# Patient Record
Sex: FEMALE | Race: WHITE | NOT HISPANIC OR LATINO | URBAN - METROPOLITAN AREA
[De-identification: names, ages, dates, MRNs, and addresses within clinical notes are randomized per-mention and may not be internally consistent; named-entity substitution may affect disease eponyms.]

---

## 2018-03-01 ENCOUNTER — INPATIENT (INPATIENT)
Facility: HOSPITAL | Age: 73
LOS: 4 days | Discharge: HOME | DRG: 440 | End: 2018-03-06
Attending: INTERNAL MEDICINE | Admitting: INTERNAL MEDICINE
Payer: MEDICARE

## 2018-03-01 VITALS
HEIGHT: 66 IN | OXYGEN SATURATION: 99 % | DIASTOLIC BLOOD PRESSURE: 65 MMHG | WEIGHT: 166.89 LBS | HEART RATE: 78 BPM | SYSTOLIC BLOOD PRESSURE: 137 MMHG | TEMPERATURE: 98 F | RESPIRATION RATE: 20 BRPM

## 2018-03-01 DIAGNOSIS — Z98.890 OTHER SPECIFIED POSTPROCEDURAL STATES: Chronic | ICD-10-CM

## 2018-03-01 DIAGNOSIS — K85.90 ACUTE PANCREATITIS WITHOUT NECROSIS OR INFECTION, UNSPECIFIED: ICD-10-CM

## 2018-03-01 DIAGNOSIS — E11.9 TYPE 2 DIABETES MELLITUS WITHOUT COMPLICATIONS: ICD-10-CM

## 2018-03-01 DIAGNOSIS — Z29.9 ENCOUNTER FOR PROPHYLACTIC MEASURES, UNSPECIFIED: ICD-10-CM

## 2018-03-01 DIAGNOSIS — I10 ESSENTIAL (PRIMARY) HYPERTENSION: ICD-10-CM

## 2018-03-01 LAB
ALBUMIN SERPL ELPH-MCNC: 3.7 G/DL — SIGNIFICANT CHANGE UP (ref 3.5–5)
ALP SERPL-CCNC: 100 U/L — SIGNIFICANT CHANGE UP (ref 40–120)
ALT FLD-CCNC: 13 U/L DA — SIGNIFICANT CHANGE UP (ref 10–60)
ANION GAP SERPL CALC-SCNC: 6 MMOL/L — SIGNIFICANT CHANGE UP (ref 5–17)
AST SERPL-CCNC: 7 U/L — LOW (ref 10–40)
BILIRUB SERPL-MCNC: 0.4 MG/DL — SIGNIFICANT CHANGE UP (ref 0.2–1.2)
BUN SERPL-MCNC: 14 MG/DL — SIGNIFICANT CHANGE UP (ref 7–18)
CALCIUM SERPL-MCNC: 9.2 MG/DL — SIGNIFICANT CHANGE UP (ref 8.4–10.5)
CHLORIDE SERPL-SCNC: 101 MMOL/L — SIGNIFICANT CHANGE UP (ref 96–108)
CO2 SERPL-SCNC: 30 MMOL/L — SIGNIFICANT CHANGE UP (ref 22–31)
CREAT SERPL-MCNC: 0.82 MG/DL — SIGNIFICANT CHANGE UP (ref 0.5–1.3)
GLUCOSE BLDC GLUCOMTR-MCNC: 110 MG/DL — HIGH (ref 70–99)
GLUCOSE SERPL-MCNC: 182 MG/DL — HIGH (ref 70–99)
HCT VFR BLD CALC: 42 % — SIGNIFICANT CHANGE UP (ref 34.5–45)
HGB BLD-MCNC: 13.7 G/DL — SIGNIFICANT CHANGE UP (ref 11.5–15.5)
LACTATE SERPL-SCNC: 1 MMOL/L — SIGNIFICANT CHANGE UP (ref 0.7–2)
LIDOCAIN IGE QN: 381 U/L — SIGNIFICANT CHANGE UP (ref 73–393)
MCHC RBC-ENTMCNC: 27 PG — SIGNIFICANT CHANGE UP (ref 27–34)
MCHC RBC-ENTMCNC: 32.6 GM/DL — SIGNIFICANT CHANGE UP (ref 32–36)
MCV RBC AUTO: 83 FL — SIGNIFICANT CHANGE UP (ref 80–100)
PLATELET # BLD AUTO: 346 K/UL — SIGNIFICANT CHANGE UP (ref 150–400)
POTASSIUM SERPL-MCNC: 4.1 MMOL/L — SIGNIFICANT CHANGE UP (ref 3.5–5.3)
POTASSIUM SERPL-SCNC: 4.1 MMOL/L — SIGNIFICANT CHANGE UP (ref 3.5–5.3)
PROT SERPL-MCNC: 8.1 G/DL — SIGNIFICANT CHANGE UP (ref 6–8.3)
RBC # BLD: 5.06 M/UL — SIGNIFICANT CHANGE UP (ref 3.8–5.2)
RBC # FLD: 11.2 % — SIGNIFICANT CHANGE UP (ref 10.3–14.5)
SODIUM SERPL-SCNC: 137 MMOL/L — SIGNIFICANT CHANGE UP (ref 135–145)
WBC # BLD: 13.5 K/UL — HIGH (ref 3.8–10.5)
WBC # FLD AUTO: 13.5 K/UL — HIGH (ref 3.8–10.5)

## 2018-03-01 PROCEDURE — 99285 EMERGENCY DEPT VISIT HI MDM: CPT

## 2018-03-01 PROCEDURE — 71046 X-RAY EXAM CHEST 2 VIEWS: CPT | Mod: 26

## 2018-03-01 PROCEDURE — 74177 CT ABD & PELVIS W/CONTRAST: CPT | Mod: 26

## 2018-03-01 RX ORDER — SODIUM CHLORIDE 9 MG/ML
1000 INJECTION INTRAMUSCULAR; INTRAVENOUS; SUBCUTANEOUS ONCE
Qty: 0 | Refills: 0 | Status: COMPLETED | OUTPATIENT
Start: 2018-03-01 | End: 2018-03-01

## 2018-03-01 RX ORDER — FAMOTIDINE 10 MG/ML
20 INJECTION INTRAVENOUS ONCE
Qty: 0 | Refills: 0 | Status: COMPLETED | OUTPATIENT
Start: 2018-03-01 | End: 2018-03-01

## 2018-03-01 RX ORDER — LEVOTHYROXINE SODIUM 125 MCG
1 TABLET ORAL
Qty: 0 | Refills: 0 | COMMUNITY

## 2018-03-01 RX ORDER — ENOXAPARIN SODIUM 100 MG/ML
40 INJECTION SUBCUTANEOUS DAILY
Qty: 0 | Refills: 0 | Status: DISCONTINUED | OUTPATIENT
Start: 2018-03-01 | End: 2018-03-06

## 2018-03-01 RX ORDER — METFORMIN HYDROCHLORIDE 850 MG/1
1 TABLET ORAL
Qty: 0 | Refills: 0 | COMMUNITY

## 2018-03-01 RX ORDER — INSULIN LISPRO 100/ML
VIAL (ML) SUBCUTANEOUS
Qty: 0 | Refills: 0 | Status: DISCONTINUED | OUTPATIENT
Start: 2018-03-01 | End: 2018-03-06

## 2018-03-01 RX ORDER — SODIUM CHLORIDE 9 MG/ML
1000 INJECTION, SOLUTION INTRAVENOUS
Qty: 0 | Refills: 0 | Status: DISCONTINUED | OUTPATIENT
Start: 2018-03-01 | End: 2018-03-03

## 2018-03-01 RX ORDER — LEVOTHYROXINE SODIUM 125 MCG
100 TABLET ORAL DAILY
Qty: 0 | Refills: 0 | Status: DISCONTINUED | OUTPATIENT
Start: 2018-03-01 | End: 2018-03-06

## 2018-03-01 RX ORDER — KETOROLAC TROMETHAMINE 30 MG/ML
15 SYRINGE (ML) INJECTION EVERY 6 HOURS
Qty: 0 | Refills: 0 | Status: DISCONTINUED | OUTPATIENT
Start: 2018-03-01 | End: 2018-03-06

## 2018-03-01 RX ORDER — METOPROLOL TARTRATE 50 MG
1 TABLET ORAL
Qty: 0 | Refills: 0 | COMMUNITY

## 2018-03-01 RX ORDER — METOPROLOL TARTRATE 50 MG
50 TABLET ORAL
Qty: 0 | Refills: 0 | Status: DISCONTINUED | OUTPATIENT
Start: 2018-03-01 | End: 2018-03-06

## 2018-03-01 RX ADMIN — SODIUM CHLORIDE 500 MILLILITER(S): 9 INJECTION INTRAMUSCULAR; INTRAVENOUS; SUBCUTANEOUS at 13:58

## 2018-03-01 RX ADMIN — FAMOTIDINE 20 MILLIGRAM(S): 10 INJECTION INTRAVENOUS at 13:58

## 2018-03-01 RX ADMIN — Medication 15 MILLIGRAM(S): at 20:31

## 2018-03-01 RX ADMIN — Medication 15 MILLIGRAM(S): at 19:41

## 2018-03-01 NOTE — H&P ADULT - PROBLEM SELECTOR PLAN 1
73 yo F hx HTN, DM was sent from PMD due to abdominal pain   Abd CT:  focal edema of the pancreatic uncinate process with mild adjacent stranding Pancreatitis vs pancreatic CA  Pt meets 2/3 criteria for pancreatitis( pain and CT findings)   symptoms could also be 2/2 constipation   NPO for now  hydration   pain control    Pt might need MRCP  GI: Dr. Cowart

## 2018-03-01 NOTE — H&P ADULT - NSHPPHYSICALEXAM_GEN_ALL_CORE
Gen: NAD  HEENT: moist mucosa, PERRLA,   Neck: supple neck, no JVD  CVS: RRR, no RMG  Lungs: CTAx2, no wheezing, no rales, no rhonchi  Abd: soft, epigastric pain radiating to the back, epigastric tenderness, no distention, + BS  : no dysuria  Ext: no edema, no cyanosis   Skin: no blisters, no rashes  Neuro: AAOX3, no focal findings

## 2018-03-01 NOTE — ED PROVIDER NOTE - OBJECTIVE STATEMENT
73 y/o F pt w/ PMHx of HTN and DM and PSHx of Hysterectomy was sent to ED by PMD for epigastric abd pain and constipation for 3 days. Pt describes her pain as severe and radiating to the back. Pt reports that she is not passing gas. Pt denies fever, chills, dysuria, nausea, vomiting, or any other complaints.  Pt states that she was sent to the ED to get a CT of her abd. NKDA.

## 2018-03-01 NOTE — H&P ADULT - ASSESSMENT
71 y/o F pt w/ PMHx of HTN and DM  ED by PMD for epigastric abd pain and constipation for 3 days. Pt describes her pain as severe and radiating to the back.  Pt reports that she is not passing gas. Pt states that she was sent to the ED to get a CT of her abd for concerns of pancreatitis. At Ed abdominal CT was done and showed focal edema of the pancreatic uncinate process with mild adjacent stranding. Findings may represent focal acute pancreatitis or pancreatic cancer.  Pt denies fever, chills, dysuria, nausea, vomiting, or any other complaints.

## 2018-03-01 NOTE — ED ADULT NURSE REASSESSMENT NOTE - NS ED NURSE REASSESS COMMENT FT1
received pt awake alert oriented x 3 not in distress with saline lock intact no redness no swelling noted.
verbal report given to nurse hannah.
At present time patient refusing pain medication Dr. Johns at bedside doing admission. patient made aware if pain becomes worse and she requires pain medication to inform staff members. Patient verbalizes understanding continue to monitor patient.

## 2018-03-01 NOTE — ED ADULT NURSE NOTE - ED STAT RN HANDOFF DETAILS 2
Patient received from intake report from RN Bert patient admitted to medicine NPO status maintained , IV hydration in progress. Patient c/o epigastric pain on pain scale 6/10 crampy pain, no orders as of yet resident to be contacted. IV access to left AC patent and intact , A&OX4, OOB with assistance. Patient resting family at bedside instructed to call if needed. patient and family verbalize understanding continue to monitor patient.

## 2018-03-02 DIAGNOSIS — E03.9 HYPOTHYROIDISM, UNSPECIFIED: ICD-10-CM

## 2018-03-02 LAB
CANCER AG19-9 SERPL-ACNC: 39.4 U/ML — SIGNIFICANT CHANGE UP
CHOLEST SERPL-MCNC: 135 MG/DL — SIGNIFICANT CHANGE UP (ref 10–199)
GLUCOSE BLDC GLUCOMTR-MCNC: 104 MG/DL — HIGH (ref 70–99)
GLUCOSE BLDC GLUCOMTR-MCNC: 114 MG/DL — HIGH (ref 70–99)
GLUCOSE BLDC GLUCOMTR-MCNC: 87 MG/DL — SIGNIFICANT CHANGE UP (ref 70–99)
GLUCOSE BLDC GLUCOMTR-MCNC: 98 MG/DL — SIGNIFICANT CHANGE UP (ref 70–99)
HBA1C BLD-MCNC: 6.3 % — HIGH (ref 4–5.6)
HDLC SERPL-MCNC: 48 MG/DL — SIGNIFICANT CHANGE UP (ref 40–125)
LIPID PNL WITH DIRECT LDL SERPL: 67 MG/DL — SIGNIFICANT CHANGE UP
TOTAL CHOLESTEROL/HDL RATIO MEASUREMENT: 2.8 RATIO — LOW (ref 3.3–7.1)
TRIGL SERPL-MCNC: 102 MG/DL — SIGNIFICANT CHANGE UP (ref 10–149)

## 2018-03-02 PROCEDURE — 76700 US EXAM ABDOM COMPLETE: CPT | Mod: 26

## 2018-03-02 RX ADMIN — Medication 50 MILLIGRAM(S): at 06:12

## 2018-03-02 RX ADMIN — Medication 100 MICROGRAM(S): at 06:12

## 2018-03-02 RX ADMIN — Medication 50 MILLIGRAM(S): at 17:10

## 2018-03-02 RX ADMIN — ENOXAPARIN SODIUM 40 MILLIGRAM(S): 100 INJECTION SUBCUTANEOUS at 13:35

## 2018-03-02 NOTE — PROGRESS NOTE ADULT - PROBLEM SELECTOR PLAN 1
CT abd showed focal edema of pancreas. Lipase 381. Elevated wbc count of 13.5. Normal AST/ALT. Pt is NPO  -GI to meet with patient today Dr. Cowart -f/u consult  -C/w toradol 15 mg IV q6 for pain   -F/u CA 19-9 CT abd showed focal edema of pancreas. Lipase 381. Elevated wbc count of 13.5. Normal AST/ALT.   -GI to meet with patient today Dr. Cowart -f/u consult  -Clear liquid diet  -C/w toradol 15 mg IV q6 for pain   -F/u CA 19-9 CT abd showed focal edema of pancreas. Lipase 381. Elevated wbc count of 13.5. Normal AST/ALT. Ca19-9 normal-39.4  -GI to meet with patient today Dr. Cowart -f/u consult  -Clear liquid diet  -C/w toradol 15 mg IV q6 for pain CT abd showed focal edema of pancreas. Lipase 381. Elevated wbc count of 13.5. Normal AST/ALT. Ca19-9 normal-39.4  -GI to meet with patient today Dr. Cowart -f/u consult  - will keep pt NPO until GI see pt  f/u US abd , r/o gallstone  no hx etoh use, lipid profile normal  -C/w toradol 15 mg IV q6 for pain

## 2018-03-02 NOTE — PROGRESS NOTE ADULT - SUBJECTIVE AND OBJECTIVE BOX
Patient seen and examined at bedside. Was brought up from the ED last night. Says her pain has been controlled since receiving Toradol. Denies feeling feverish, chills, nausea, or vomiting.    ICU Vital Signs Last 24 Hrs  T(C): 36.8 (02 Mar 2018 05:19), Max: 37.4 (01 Mar 2018 22:20)  T(F): 98.2 (02 Mar 2018 05:19), Max: 99.3 (01 Mar 2018 22:20)  HR: 84 (02 Mar 2018 05:19) (78 - 84)  BP: 155/71 (02 Mar 2018 05:19) (137/65 - 155/71)  RR: 17 (02 Mar 2018 05:19) (16 - 20)  SpO2: 95% (02 Mar 2018 05:19) (95% - 99%)    MEDICATIONS  (STANDING):  enoxaparin Injectable 40 milliGRAM(s) SubCutaneous daily  insulin lispro (HumaLOG) corrective regimen sliding scale   SubCutaneous three times a day before meals  lactated ringers. 1000 milliLiter(s) (150 mL/Hr) IV Continuous <Continuous>  levothyroxine 100 MICROGram(s) Oral daily  metoprolol     tartrate 50 milliGRAM(s) Oral two times a day    MEDICATIONS  (PRN):  ketorolac   Injectable 15 milliGRAM(s) IV Push every 6 hours PRN Moderate Pain (4 - 6)    .  LABS:                         13.7   13.5  )-----------( 346      ( 01 Mar 2018 13:44 )             42.0     03-01    137  |  101  |  14  ----------------------------<  182<H>  4.1   |  30  |  0.82    Ca    9.2      01 Mar 2018 13:46    TPro  8.1  /  Alb  3.7  /  TBili  0.4  /  DBili  x   /  AST  7<L>  /  ALT  13  /  AlkPhos  100  03-01      CT Abd: Focal edema of pancreatic uncinate process with mild adjacent stranding.    PE:  General: Well appearing female lying in bed. No acute distress  HENT: Normocephalic, atraumatic. Neck supple, no JVD.  Lungs: Clear to auscultation bilaterally.   Cardiac: Regular rate, rhythm. No murmurs  Abd: Soft, mild tenderness to palpation upon upper and lower left quadrants, +epigastric tenderness. No rebound, no guarding. +BS  Extremities: No lower extremity edema bilaterally

## 2018-03-03 LAB
ANION GAP SERPL CALC-SCNC: 10 MMOL/L — SIGNIFICANT CHANGE UP (ref 5–17)
BASOPHILS # BLD AUTO: 0 K/UL — SIGNIFICANT CHANGE UP (ref 0–0.2)
BASOPHILS NFR BLD AUTO: 0.4 % — SIGNIFICANT CHANGE UP (ref 0–2)
BUN SERPL-MCNC: 10 MG/DL — SIGNIFICANT CHANGE UP (ref 7–18)
CALCIUM SERPL-MCNC: 8.7 MG/DL — SIGNIFICANT CHANGE UP (ref 8.4–10.5)
CHLORIDE SERPL-SCNC: 106 MMOL/L — SIGNIFICANT CHANGE UP (ref 96–108)
CO2 SERPL-SCNC: 27 MMOL/L — SIGNIFICANT CHANGE UP (ref 22–31)
CREAT SERPL-MCNC: 0.56 MG/DL — SIGNIFICANT CHANGE UP (ref 0.5–1.3)
EOSINOPHIL # BLD AUTO: 0.3 K/UL — SIGNIFICANT CHANGE UP (ref 0–0.5)
EOSINOPHIL NFR BLD AUTO: 3.2 % — SIGNIFICANT CHANGE UP (ref 0–6)
GLUCOSE BLDC GLUCOMTR-MCNC: 100 MG/DL — HIGH (ref 70–99)
GLUCOSE BLDC GLUCOMTR-MCNC: 104 MG/DL — HIGH (ref 70–99)
GLUCOSE BLDC GLUCOMTR-MCNC: 91 MG/DL — SIGNIFICANT CHANGE UP (ref 70–99)
GLUCOSE BLDC GLUCOMTR-MCNC: 97 MG/DL — SIGNIFICANT CHANGE UP (ref 70–99)
GLUCOSE SERPL-MCNC: 80 MG/DL — SIGNIFICANT CHANGE UP (ref 70–99)
HCT VFR BLD CALC: 33.8 % — LOW (ref 34.5–45)
HGB BLD-MCNC: 11.7 G/DL — SIGNIFICANT CHANGE UP (ref 11.5–15.5)
LIDOCAIN IGE QN: 181 U/L — SIGNIFICANT CHANGE UP (ref 73–393)
LYMPHOCYTES # BLD AUTO: 2 K/UL — SIGNIFICANT CHANGE UP (ref 1–3.3)
LYMPHOCYTES # BLD AUTO: 23.7 % — SIGNIFICANT CHANGE UP (ref 13–44)
MCHC RBC-ENTMCNC: 29.1 PG — SIGNIFICANT CHANGE UP (ref 27–34)
MCHC RBC-ENTMCNC: 34.6 GM/DL — SIGNIFICANT CHANGE UP (ref 32–36)
MCV RBC AUTO: 84 FL — SIGNIFICANT CHANGE UP (ref 80–100)
MONOCYTES # BLD AUTO: 0.5 K/UL — SIGNIFICANT CHANGE UP (ref 0–0.9)
MONOCYTES NFR BLD AUTO: 5.9 % — SIGNIFICANT CHANGE UP (ref 2–14)
NEUTROPHILS # BLD AUTO: 5.5 K/UL — SIGNIFICANT CHANGE UP (ref 1.8–7.4)
NEUTROPHILS NFR BLD AUTO: 66.8 % — SIGNIFICANT CHANGE UP (ref 43–77)
PLATELET # BLD AUTO: 243 K/UL — SIGNIFICANT CHANGE UP (ref 150–400)
POTASSIUM SERPL-MCNC: 3.8 MMOL/L — SIGNIFICANT CHANGE UP (ref 3.5–5.3)
POTASSIUM SERPL-SCNC: 3.8 MMOL/L — SIGNIFICANT CHANGE UP (ref 3.5–5.3)
RBC # BLD: 4.02 M/UL — SIGNIFICANT CHANGE UP (ref 3.8–5.2)
RBC # FLD: 11.1 % — SIGNIFICANT CHANGE UP (ref 10.3–14.5)
SODIUM SERPL-SCNC: 143 MMOL/L — SIGNIFICANT CHANGE UP (ref 135–145)
WBC # BLD: 8.3 K/UL — SIGNIFICANT CHANGE UP (ref 3.8–10.5)
WBC # FLD AUTO: 8.3 K/UL — SIGNIFICANT CHANGE UP (ref 3.8–10.5)

## 2018-03-03 RX ADMIN — Medication 100 MICROGRAM(S): at 06:51

## 2018-03-03 RX ADMIN — Medication 50 MILLIGRAM(S): at 17:19

## 2018-03-03 RX ADMIN — Medication 50 MILLIGRAM(S): at 06:51

## 2018-03-03 RX ADMIN — ENOXAPARIN SODIUM 40 MILLIGRAM(S): 100 INJECTION SUBCUTANEOUS at 12:01

## 2018-03-03 NOTE — CONSULT NOTE ADULT - SUBJECTIVE AND OBJECTIVE BOX
[  ] STAT REQUEST              [ X]R OUTINE REQUEST    Patient is a 72 year old female admitted with abdominal pain. GI consulted to evaluate.      HPI:  72 year old female with multiple medical problems presented with epigastric pain radiating to her back associated with nausea but no vomiting. Patient denies hematemesis, hematochezia, melena, fever, chills, chest pain, SOB, cough, palpitation, hematuria, dysuria or diarrhea.          PAIN MANAGEMENT:  Pain Scale:                7-8 /10  Pain Location:  Epigastric abdominal pain      Prior Colonoscopy: Unknown      PAST MEDICAL HISTORY  DM   HTN       PAST SURGICAL HISTORY  Abdominal hysterectomy      Allergies    No Known Allergies         MEDICATIONS  (STANDING):  enoxaparin Injectable 40 milliGRAM(s) SubCutaneous daily  insulin lispro (HumaLOG) corrective regimen sliding scale   SubCutaneous three times a day before meals  levothyroxine 100 MICROGram(s) Oral daily  metoprolol     tartrate 50 milliGRAM(s) Oral two times a day    MEDICATIONS  (PRN):  ketorolac   Injectable 15 milliGRAM(s) IV Push every 6 hours PRN Moderate Pain (4 - 6)      SOCIAL HISTORY  Advanced Directives:       [ X ] Full Code       [  ] DNR  Marital Status:         [ X ] M      [  ] S      [  ] D       [  ] W  Children:       [ X ] Yes      [  ] No  Occupation:        [  ] Employed       [ X ] Unemployed       [  ] Retired  Diet:       [ X ] Regular       [  ] PEG feeding          [  ] NG tube feeding  Drug Use:           [ X ] Patient denied          [  ] Yes  Alcohol:           [ X ] No             [  ] Yes (socially)         [  ] Yes (chronic)  Tobacco:           [  ] Yes           [ X ] No      FAMILY HISTORY  [ X ] Heart Disease(father)            [  ] Diabetes             [  ] HTN             [  ] Colon Cancer             [  ] Stomach Cancer              [  ] Pancreatic Cancer      VITAL SIGNS   Vital Signs Last 24 Hrs  T(C): 36.5 (02 Mar 2018 20:51), Max: 36.8 (02 Mar 2018 14:26)  T(F): 97.7 (02 Mar 2018 20:51), Max: 98.2 (02 Mar 2018 14:26)  HR: 69 (02 Mar 2018 20:51) (69 - 72)  BP: 141/63 (02 Mar 2018 20:51) (141/63 - 160/81)   RR: 17 (02 Mar 2018 20:51) (17 - 17)  SpO2: 96% (02 Mar 2018 20:51) (95% - 96%)          CBC Full  -  ( 03 Mar 2018 06:13 )  WBC Count : 8.3 K/uL  Hemoglobin : 11.7 g/dL  Hematocrit : 33.8 %  Platelet Count - Automated : 243 K/uL  Mean Cell Volume : 84.0 fl  Mean Cell Hemoglobin : 29.1 pg  Mean Cell Hemoglobin Concentration : 34.6 gm/dL  Auto Neutrophil # : 5.5 K/uL  Auto Lymphocyte # : 2.0 K/uL  Auto Monocyte # : 0.5 K/uL  Auto Eosinophil # : 0.3 K/uL  Auto Basophil # : 0.0 K/uL  Auto Neutrophil % : 66.8 %  Auto Lymphocyte % : 23.7 %  Auto Monocyte % : 5.9 %  Auto Eosinophil % : 3.2 %  Auto Basophil % : 0.4 %      03-03    143  |  106  |  10  ----------------------------<  80  3.8   |  27  |  0.56    Ca    8.7      03 Mar 2018 06:13    TPro  8.1  /  Alb  3.7  /  TBili  0.4  /  DBili  x   /  AST  7<L>  /  ALT  13  /  AlkPhos  100  03-01      Lipase, Serum: 181 U/L (03-03 @ 10:58)  Lipase, Serum: 381 U/L (03.01.18 @ 13:44)      RADIOLOGY/IMAGING                EXAM:  CT ABDOMEN AND PELVIS OC IC                            PROCEDURE DATE:  03/01/2018          INTERPRETATION:  CT of the abdomen and pelvis with IV contrast    Clinical Indication: abdominal pain    Technique: Axial multidetector CT images of the abdomen and pelvis are   acquired following the administration of oral and IV contrast (95 cc   Omnipaque-350 administered, 5 cc discarded).    Comparison: None.    Findings:    Abdomen: Limited sections through the lung bases demonstrate small   bilateral atelectasis. Mild hepatic steatosis. Small focal mural   thickening at the gallbladder fundus. No CT evidence for a gallstone. The   common bile duct is not dilated. There is focal edema of the pancreatic   uncinate processes with mild adjacent stranding. Findings may represent   focal acute pancreatitis, or pancreatic cancer. No evidence for   pancreatic necrosis at this time.     Mild splenomegaly at 13.3 cm. The adrenals and the right kidney appear   unremarkable. Tiny hypodense lesion in the left kidney, too small to   characterize. No evidence for a ureteral calculus. No hydronephrosis.    The appendix appears normal. Colonic diverticulosis without evidence for   diverticulitis. No evidence for bowel obstruction, or grossly thickened   bowel wall. No evidence for free air, ascites, or enlarged lymph node.    Pelvis: The urinary bladder is within normal limits. The uterus is not   visualized, probably surgically absent. Sigmoid diverticulosis without   evidence for diverticulitis. No pelvic free fluid, or enlarged lymph node.    Impression: Mild hepatic steatosis.    Small focal mural thickening at the gallbladder fundus may represent   adenomyomatosis. If clinically indicated, abdominal MR without and with   IV contrast may be pursued for further evaluation on a nonemergent   outpatient basis.    Focal edema of the pancreatic uncinate process with mild adjacent   stranding. Findings may represent focal acute pancreatitis or pancreatic   cancer. Clinical correlation with pancreatic enzymes is recommended. No   evidence for pancreatic necrosis at this time.     Mild splenomegaly.      EXAM:  US ABDOMEN COMPLETE                            PROCEDURE DATE:  03/02/2018          INTERPRETATION:  EXAM: US ABDOMEN COMPLETE   INDICATION: Pancreatitis. r/o gallstones.  COMPARISON: CT abdomen and pelvis 3/1/2018.    TECHNIQUE: Grayscale ultrasound of the abdomen was performed.    FINDINGS:  Liver: Increased echogenicity and echotexture. No focal hepatic mass is   demonstrated. Measures 19.5 cm in craniocaudal span. Portal and hepatic   veins are patent.    Bile ducts: No intra or extrahepatic biliary ductal dilatation. Common   duct = 5 mm.    Gallbladder: The focal mural thickening of the gallbladder fundus seen on   CT is not well seen on ultrasound. No stones, pericholecystic fluid or   gallbladder wall thickening. No sonographic Damon's sign was elicited by   the sonographer.    Pancreas: Visualized pancreatic head and body are grossly unremarkable.   The pancreatic tail is obscured by bowel gas.     Right kidney: Survey views. No hydronephrosis. Measures 11.6 x 4.4 x 4.2  cm.  Left kidney: Survey views. No hydronephrosis. Measures 10.4 x 5.1 x 5.0   cm.    Spleen: Mild splenomegaly, partially visualized.    Peritoneum: No ascites.    Proximal Aorta & IVC: Visualized abdominal aorta and IVC are grossly   unremarkable.    IMPRESSION:  No sonographic evidence of cholelithiasis or acute cholecystitis. No   biliary ductal dilatation.    Visualized pancreatic head and body are grossly unremarkable in   sonographic appearance.    The focal mural thickening of the gallbladder fundus seen on CT is not   well seen on ultrasound; however, the recommendation remains; if   clinically indicated, MRI abdomen without and with contrast may be   performed for further evaluation on a nonemergent outpatient basis.    Diffuse hepatic steatosis and hepatomegaly.    Mild splenomegaly.

## 2018-03-03 NOTE — CONSULT NOTE ADULT - ASSESSMENT
Abdominal pain  1. R/o pancreatitis  2. R/o pancreatic cancer    Suggestions:    1. MRI of abdomen  2. Check   3. NPO   4. IV flouid hydration  5. Reopeat amylase and lipase

## 2018-03-03 NOTE — PROGRESS NOTE ADULT - PROBLEM SELECTOR PLAN 1
CT abd showed focal edema of pancreas. Lipase 381. Elevated wbc count of 13.5. Normal AST/ALT. Ca19-9 normal-39.4  -GI to meet with patient today Dr. Cowart -f/u consult  - will keep pt NPO until GI see pt  f/u US abd , r/o gallstone  no hx etoh use, lipid profile normal  -C/w toradol 15 mg IV q6 for pain

## 2018-03-03 NOTE — PROGRESS NOTE ADULT - SUBJECTIVE AND OBJECTIVE BOX
CHIEF COMPLAINT:Patient is a 72y old  Female who presents with a chief complaint of abdominal pain. Pt appears comfortable, no abdominal pain.    	  REVIEW OF SYSTEMS:  CONSTITUTIONAL: No fever, weight loss, or fatigue  EYES: No eye pain, visual disturbances, or discharge  ENT:  No difficulty hearing, tinnitus, vertigo; No sinus or throat pain  NECK: No pain or stiffness  RESPIRATORY: No cough, wheezing, chills or hemoptysis; No Shortness of Breath  CARDIOVASCULAR: No chest pain, palpitations, passing out, dizziness, or leg swelling  GASTROINTESTINAL: No abdominal or epigastric pain. No nausea, vomiting, or hematemesis; No diarrhea or constipation. No melena or hematochezia.  GENITOURINARY: No dysuria, frequency, hematuria, or incontinence  NEUROLOGICAL: No headaches, memory loss, loss of strength, numbness, or tremors  SKIN: No itching, burning, rashes, or lesions   LYMPH Nodes: No enlarged glands  ENDOCRINE: No heat or cold intolerance; No hair loss  MUSCULOSKELETAL: No joint pain or swelling; No muscle, back, or extremity pain  PSYCHIATRIC: No depression, anxiety, mood swings, or difficulty sleeping  HEME/LYMPH: No easy bruising, or bleeding gums  ALLERGY AND IMMUNOLOGIC: No hives or eczema	      PHYSICAL EXAM:  T(C): 36.5 (03-02-18 @ 20:51), Max: 36.8 (03-02-18 @ 14:26)  HR: 69 (03-02-18 @ 20:51) (69 - 72)  BP: 141/63 (03-02-18 @ 20:51) (141/63 - 160/81)  RR: 17 (03-02-18 @ 20:51) (17 - 17)  SpO2: 96% (03-02-18 @ 20:51) (95% - 96%)    Appearance: Normal	  HEENT:   Normal oral mucosa, PERRL, EOMI	  Lymphatic: No lymphadenopathy  Cardiovascular: Normal S1 S2, No JVD, No murmurs, No edema  Respiratory: Lungs clear to auscultation	  Psychiatry: A & O x 3, Mood & affect appropriate  Gastrointestinal:  Soft, Non-tender, + BS	  Skin: No rashes, No ecchymoses, No cyanosis	  Neurologic: Non-focal  Extremities: Normal range of motion, No clubbing, cyanosis or edema  Vascular: Peripheral pulses palpable 2+ bilaterally    MEDICATIONS  (STANDING):  enoxaparin Injectable 40 milliGRAM(s) SubCutaneous daily  insulin lispro (HumaLOG) corrective regimen sliding scale   SubCutaneous three times a day before meals  lactated ringers. 1000 milliLiter(s) (150 mL/Hr) IV Continuous <Continuous>  levothyroxine 100 MICROGram(s) Oral daily  metoprolol     tartrate 50 milliGRAM(s) Oral two times a day      LABS:	 	                      11.7   8.3   )-----------( 243      ( 03 Mar 2018 06:13 )             33.8     03-03    143  |  106  |  10  ----------------------------<  80  3.8   |  27  |  0.56    Ca    8.7      03 Mar 2018 06:13    TPro  8.1  /  Alb  3.7  /  TBili  0.4  /  DBili  x   /  AST  7<L>  /  ALT  13  /  AlkPhos  100  03-01       Lipid Profile: Cholesterol 135  LDL 67  HDL 48    Cholesterol 153  LDL 74  HDL 57      HgA1c: Hemoglobin A1C, Whole Blood: 6.3 % (03-02 @ 09:32)    Lipase, Serum (03.03.18 @ 10:58)    Lipase, Serum: 181 U/L        IMPRESSION:  No sonographic evidence of cholelithiasis or acute cholecystitis. No   biliary ductal dilatation.    Visualized pancreatic head and body are grossly unremarkable in   sonographic appearance.    The focal mural thickening of the gallbladder fundus seen on CT is not   well seen on ultrasound; however, the recommendation remains; if   clinically indicated, MRI abdomen without and with contrast may be   performed for further evaluation on a nonemergent outpatient basis.    Diffuse hepatic steatosis and hepatomegaly.    Mild splenomegaly.

## 2018-03-03 NOTE — CONSULT NOTE ADULT - NEGATIVE ENMT SYMPTOMS
no dry mouth/no nose bleeds/no throat pain/no dysphagia/no hearing difficulty/no ear pain/no gum bleeding

## 2018-03-04 LAB
ALBUMIN SERPL ELPH-MCNC: 3.3 G/DL — LOW (ref 3.5–5)
ALP SERPL-CCNC: 78 U/L — SIGNIFICANT CHANGE UP (ref 40–120)
ALT FLD-CCNC: 15 U/L DA — SIGNIFICANT CHANGE UP (ref 10–60)
ANION GAP SERPL CALC-SCNC: 6 MMOL/L — SIGNIFICANT CHANGE UP (ref 5–17)
AST SERPL-CCNC: 13 U/L — SIGNIFICANT CHANGE UP (ref 10–40)
BILIRUB SERPL-MCNC: 0.3 MG/DL — SIGNIFICANT CHANGE UP (ref 0.2–1.2)
BUN SERPL-MCNC: 8 MG/DL — SIGNIFICANT CHANGE UP (ref 7–18)
CALCIUM SERPL-MCNC: 8.9 MG/DL — SIGNIFICANT CHANGE UP (ref 8.4–10.5)
CHLORIDE SERPL-SCNC: 103 MMOL/L — SIGNIFICANT CHANGE UP (ref 96–108)
CO2 SERPL-SCNC: 31 MMOL/L — SIGNIFICANT CHANGE UP (ref 22–31)
CREAT SERPL-MCNC: 0.71 MG/DL — SIGNIFICANT CHANGE UP (ref 0.5–1.3)
GLUCOSE BLDC GLUCOMTR-MCNC: 119 MG/DL — HIGH (ref 70–99)
GLUCOSE BLDC GLUCOMTR-MCNC: 123 MG/DL — HIGH (ref 70–99)
GLUCOSE BLDC GLUCOMTR-MCNC: 129 MG/DL — HIGH (ref 70–99)
GLUCOSE BLDC GLUCOMTR-MCNC: 193 MG/DL — HIGH (ref 70–99)
GLUCOSE SERPL-MCNC: 133 MG/DL — HIGH (ref 70–99)
HCT VFR BLD CALC: 38 % — SIGNIFICANT CHANGE UP (ref 34.5–45)
HGB BLD-MCNC: 12.6 G/DL — SIGNIFICANT CHANGE UP (ref 11.5–15.5)
LIDOCAIN IGE QN: 162 U/L — SIGNIFICANT CHANGE UP (ref 73–393)
MCHC RBC-ENTMCNC: 27.3 PG — SIGNIFICANT CHANGE UP (ref 27–34)
MCHC RBC-ENTMCNC: 33.2 GM/DL — SIGNIFICANT CHANGE UP (ref 32–36)
MCV RBC AUTO: 82 FL — SIGNIFICANT CHANGE UP (ref 80–100)
PLATELET # BLD AUTO: 328 K/UL — SIGNIFICANT CHANGE UP (ref 150–400)
POTASSIUM SERPL-MCNC: 4 MMOL/L — SIGNIFICANT CHANGE UP (ref 3.5–5.3)
POTASSIUM SERPL-SCNC: 4 MMOL/L — SIGNIFICANT CHANGE UP (ref 3.5–5.3)
PROT SERPL-MCNC: 7.1 G/DL — SIGNIFICANT CHANGE UP (ref 6–8.3)
RBC # BLD: 4.62 M/UL — SIGNIFICANT CHANGE UP (ref 3.8–5.2)
RBC # FLD: 11 % — SIGNIFICANT CHANGE UP (ref 10.3–14.5)
SODIUM SERPL-SCNC: 140 MMOL/L — SIGNIFICANT CHANGE UP (ref 135–145)
WBC # BLD: 9.4 K/UL — SIGNIFICANT CHANGE UP (ref 3.8–10.5)
WBC # FLD AUTO: 9.4 K/UL — SIGNIFICANT CHANGE UP (ref 3.8–10.5)

## 2018-03-04 RX ADMIN — Medication 50 MILLIGRAM(S): at 17:48

## 2018-03-04 RX ADMIN — Medication 50 MILLIGRAM(S): at 06:05

## 2018-03-04 RX ADMIN — Medication 1: at 16:51

## 2018-03-04 RX ADMIN — Medication 100 MICROGRAM(S): at 06:05

## 2018-03-04 NOTE — DIETITIAN INITIAL EVALUATION ADULT. - OTHER INFO
Patient seen for NPO/clear liquid x4days. Patient from home lives alone, visited pt. alert but weak, reports appetite fair with 3 days of abdominal pain & constipation, denies nausea/vomiting or diarrhea PTA, stated  Lbs & unsure wt. loss/gain, presently clear liquid diet & tolerating, followed by GI/MD & consult noted, willing to have oral supplement with meals, skin intact. Discussed with MD/RN.

## 2018-03-04 NOTE — PROGRESS NOTE ADULT - SUBJECTIVE AND OBJECTIVE BOX
CHIEF COMPLAINT:Patient is a 72y old  Female who presents with a chief complaint of abdominal pain. Pt has no further abdominal pain.    	  REVIEW OF SYSTEMS:  CONSTITUTIONAL: No fever, weight loss, or fatigue  EYES: No eye pain, visual disturbances, or discharge  ENT:  No difficulty hearing, tinnitus, vertigo; No sinus or throat pain  NECK: No pain or stiffness  RESPIRATORY: No cough, wheezing, chills or hemoptysis; No Shortness of Breath  CARDIOVASCULAR: No chest pain, palpitations, passing out, dizziness, or leg swelling  GASTROINTESTINAL: No abdominal or epigastric pain. No nausea, vomiting, or hematemesis; No diarrhea or constipation. No melena or hematochezia.  GENITOURINARY: No dysuria, frequency, hematuria, or incontinence  NEUROLOGICAL: No headaches, memory loss, loss of strength, numbness, or tremors  SKIN: No itching, burning, rashes, or lesions   LYMPH Nodes: No enlarged glands  ENDOCRINE: No heat or cold intolerance; No hair loss  MUSCULOSKELETAL: No joint pain or swelling; No muscle, back, or extremity pain  PSYCHIATRIC: No depression, anxiety, mood swings, or difficulty sleeping  HEME/LYMPH: No easy bruising, or bleeding gums  ALLERGY AND IMMUNOLOGIC: No hives or eczema	    PHYSICAL EXAM:  T(C): 36.4 (03-04-18 @ 05:40), Max: 37.3 (03-03-18 @ 14:16)  HR: 64 (03-04-18 @ 09:58) (61 - 74)  BP: 144/84 (03-04-18 @ 09:58) (138/75 - 155/100)  RR: 18 (03-04-18 @ 05:40) (18 - 18)  SpO2: 97% (03-04-18 @ 05:40) (95% - 97%)  Wt(kg): --  I&O's Summary    03 Mar 2018 07:01  -  04 Mar 2018 07:00  --------------------------------------------------------  IN: 0 mL / OUT: 1 mL / NET: -1 mL        Appearance: Normal	  HEENT:   Normal oral mucosa, PERRL, EOMI	  Lymphatic: No lymphadenopathy  Cardiovascular: Normal S1 S2, No JVD, No murmurs, No edema  Respiratory: Lungs clear to auscultation	  Psychiatry: A & O x 3, Mood & affect appropriate  Gastrointestinal:  Soft, Non-tender, + BS	  Skin: No rashes, No ecchymoses, No cyanosis	  Neurologic: Non-focal  Extremities: Normal range of motion, No clubbing, cyanosis or edema  Vascular: Peripheral pulses palpable 2+ bilaterally    MEDICATIONS  (STANDING):  enoxaparin Injectable 40 milliGRAM(s) SubCutaneous daily  insulin lispro (HumaLOG) corrective regimen sliding scale   SubCutaneous three times a day before meals  levothyroxine 100 MICROGram(s) Oral daily  metoprolol     tartrate 50 milliGRAM(s) Oral two times a day      	  LABS:	 	                       12.6   9.4   )-----------( 328      ( 04 Mar 2018 08:01 )             38.0     03-04    140  |  103  |  8   ----------------------------<  133<H>  4.0   |  31  |  0.71    Ca    8.9      04 Mar 2018 08:01    TPro  7.1  /  Alb  3.3<L>  /  TBili  0.3  /  DBili  x   /  AST  13  /  ALT  15  /  AlkPhos  78  03-04      Lipid Profile: Cholesterol 135  LDL 67  HDL 48    Cholesterol 153  LDL 74  HDL 57      HgA1c: Hemoglobin A1C, Whole Blood: 6.3 % (03-02 @ 09:32)

## 2018-03-04 NOTE — DIETITIAN INITIAL EVALUATION ADULT. - NS AS NUTRI INTERV MEDICAL AND FOOD SUPPLEMENTS
Commercial beverage/once diet advance Add Glucerna Shake 1can bid as medically feasible (440kcal, 20g protein)

## 2018-03-04 NOTE — DIETITIAN INITIAL EVALUATION ADULT. - MD RECOMMEND
Pending GI decision advance diet with nutrition supplement, presently add Ensure Clear 1 can TID as medically feasible

## 2018-03-05 LAB
ANION GAP SERPL CALC-SCNC: 9 MMOL/L — SIGNIFICANT CHANGE UP (ref 5–17)
BASOPHILS # BLD AUTO: 0 K/UL — SIGNIFICANT CHANGE UP (ref 0–0.2)
BASOPHILS NFR BLD AUTO: 0.5 % — SIGNIFICANT CHANGE UP (ref 0–2)
BUN SERPL-MCNC: 8 MG/DL — SIGNIFICANT CHANGE UP (ref 7–18)
CALCIUM SERPL-MCNC: 9 MG/DL — SIGNIFICANT CHANGE UP (ref 8.4–10.5)
CANCER AG19-9 SERPL-ACNC: 38.8 U/ML — SIGNIFICANT CHANGE UP
CHLORIDE SERPL-SCNC: 102 MMOL/L — SIGNIFICANT CHANGE UP (ref 96–108)
CO2 SERPL-SCNC: 29 MMOL/L — SIGNIFICANT CHANGE UP (ref 22–31)
CREAT SERPL-MCNC: 0.8 MG/DL — SIGNIFICANT CHANGE UP (ref 0.5–1.3)
EOSINOPHIL # BLD AUTO: 0.4 K/UL — SIGNIFICANT CHANGE UP (ref 0–0.5)
EOSINOPHIL NFR BLD AUTO: 4 % — SIGNIFICANT CHANGE UP (ref 0–6)
GLUCOSE BLDC GLUCOMTR-MCNC: 132 MG/DL — HIGH (ref 70–99)
GLUCOSE BLDC GLUCOMTR-MCNC: 132 MG/DL — HIGH (ref 70–99)
GLUCOSE BLDC GLUCOMTR-MCNC: 143 MG/DL — HIGH (ref 70–99)
GLUCOSE BLDC GLUCOMTR-MCNC: 190 MG/DL — HIGH (ref 70–99)
GLUCOSE SERPL-MCNC: 144 MG/DL — HIGH (ref 70–99)
HCT VFR BLD CALC: 38.6 % — SIGNIFICANT CHANGE UP (ref 34.5–45)
HGB BLD-MCNC: 13.1 G/DL — SIGNIFICANT CHANGE UP (ref 11.5–15.5)
LYMPHOCYTES # BLD AUTO: 2.2 K/UL — SIGNIFICANT CHANGE UP (ref 1–3.3)
LYMPHOCYTES # BLD AUTO: 23.8 % — SIGNIFICANT CHANGE UP (ref 13–44)
MCHC RBC-ENTMCNC: 28.4 PG — SIGNIFICANT CHANGE UP (ref 27–34)
MCHC RBC-ENTMCNC: 34 GM/DL — SIGNIFICANT CHANGE UP (ref 32–36)
MCV RBC AUTO: 83.6 FL — SIGNIFICANT CHANGE UP (ref 80–100)
MONOCYTES # BLD AUTO: 0.4 K/UL — SIGNIFICANT CHANGE UP (ref 0–0.9)
MONOCYTES NFR BLD AUTO: 4.6 % — SIGNIFICANT CHANGE UP (ref 2–14)
NEUTROPHILS # BLD AUTO: 6.1 K/UL — SIGNIFICANT CHANGE UP (ref 1.8–7.4)
NEUTROPHILS NFR BLD AUTO: 67 % — SIGNIFICANT CHANGE UP (ref 43–77)
PLATELET # BLD AUTO: 343 K/UL — SIGNIFICANT CHANGE UP (ref 150–400)
POTASSIUM SERPL-MCNC: 3.2 MMOL/L — LOW (ref 3.5–5.3)
POTASSIUM SERPL-SCNC: 3.2 MMOL/L — LOW (ref 3.5–5.3)
RBC # BLD: 4.62 M/UL — SIGNIFICANT CHANGE UP (ref 3.8–5.2)
RBC # FLD: 11.2 % — SIGNIFICANT CHANGE UP (ref 10.3–14.5)
SODIUM SERPL-SCNC: 140 MMOL/L — SIGNIFICANT CHANGE UP (ref 135–145)
WBC # BLD: 9.1 K/UL — SIGNIFICANT CHANGE UP (ref 3.8–10.5)
WBC # FLD AUTO: 9.1 K/UL — SIGNIFICANT CHANGE UP (ref 3.8–10.5)

## 2018-03-05 PROCEDURE — 74181 MRI ABDOMEN W/O CONTRAST: CPT | Mod: 26

## 2018-03-05 RX ORDER — POTASSIUM CHLORIDE 20 MEQ
20 PACKET (EA) ORAL EVERY 4 HOURS
Qty: 0 | Refills: 0 | Status: COMPLETED | OUTPATIENT
Start: 2018-03-05 | End: 2018-03-05

## 2018-03-05 RX ORDER — POTASSIUM CHLORIDE 20 MEQ
10 PACKET (EA) ORAL
Qty: 0 | Refills: 0 | Status: DISCONTINUED | OUTPATIENT
Start: 2018-03-05 | End: 2018-03-05

## 2018-03-05 RX ADMIN — Medication 100 MILLIEQUIVALENT(S): at 09:54

## 2018-03-05 RX ADMIN — Medication 50 MILLIGRAM(S): at 05:48

## 2018-03-05 RX ADMIN — Medication 50 MILLIGRAM(S): at 18:24

## 2018-03-05 RX ADMIN — Medication 20 MILLIEQUIVALENT(S): at 13:50

## 2018-03-05 RX ADMIN — Medication 20 MILLIEQUIVALENT(S): at 18:24

## 2018-03-05 RX ADMIN — Medication 100 MICROGRAM(S): at 05:48

## 2018-03-05 NOTE — PROGRESS NOTE ADULT - SUBJECTIVE AND OBJECTIVE BOX
Patient seen and examined at bedside. No events overnight. Says that she currently does not have abdominal pain. Had bowel movement this AM. Is awaiting to undergo MRCP today.    ICU Vital Signs Last 24 Hrs  T(C): 36.9 (05 Mar 2018 05:22), Max: 37.1 (04 Mar 2018 14:32)  T(F): 98.4 (05 Mar 2018 05:22), Max: 98.8 (04 Mar 2018 14:32)  HR: 73 (05 Mar 2018 05:22) (64 - 79)  BP: 150/66 (05 Mar 2018 05:22) (132/70 - 161/81)  RR: 18 (05 Mar 2018 05:22) (18 - 18)  SpO2: 99% (05 Mar 2018 05:22) (95% - 99%)    .  LABS:                         13.1   9.1   )-----------( 343      ( 05 Mar 2018 06:54 )             38.6     03-05    140  |  102  |  8   ----------------------------<  144<H>  3.2<L>   |  29  |  0.80    Ca    9.0      05 Mar 2018 06:54    TPro  7.1  /  Alb  3.3<L>  /  TBili  0.3  /  DBili  x   /  AST  13  /  ALT  15  /  AlkPhos  78  03-04    MEDICATIONS  (STANDING):  enoxaparin Injectable 40 milliGRAM(s) SubCutaneous daily  insulin lispro (HumaLOG) corrective regimen sliding scale   SubCutaneous three times a day before meals  levothyroxine 100 MICROGram(s) Oral daily  LORazepam   Injectable 1 milliGRAM(s) IV Push once  metoprolol     tartrate 50 milliGRAM(s) Oral two times a day  potassium chloride    Tablet ER 20 milliEquivalent(s) Oral every 4 hours    MEDICATIONS  (PRN):  ketorolac   Injectable 15 milliGRAM(s) IV Push every 6 hours PRN Moderate Pain (4 - 6) Patient seen and examined at bedside. No events overnight. Says that she currently does not have abdominal pain. Had bowel movement this AM. Is awaiting to undergo MRCP today.    ICU Vital Signs Last 24 Hrs  T(C): 36.9 (05 Mar 2018 05:22), Max: 37.1 (04 Mar 2018 14:32)  T(F): 98.4 (05 Mar 2018 05:22), Max: 98.8 (04 Mar 2018 14:32)  HR: 73 (05 Mar 2018 05:22) (64 - 79)  BP: 150/66 (05 Mar 2018 05:22) (132/70 - 161/81)  RR: 18 (05 Mar 2018 05:22) (18 - 18)  SpO2: 99% (05 Mar 2018 05:22) (95% - 99%)    .  LABS:                         13.1   9.1   )-----------( 343      ( 05 Mar 2018 06:54 )             38.6     03-05    140  |  102  |  8   ----------------------------<  144<H>  3.2<L>   |  29  |  0.80    Ca    9.0      05 Mar 2018 06:54    TPro  7.1  /  Alb  3.3<L>  /  TBili  0.3  /  DBili  x   /  AST  13  /  ALT  15  /  AlkPhos  78  03-04    MEDICATIONS  (STANDING):  enoxaparin Injectable 40 milliGRAM(s) SubCutaneous daily  insulin lispro (HumaLOG) corrective regimen sliding scale   SubCutaneous three times a day before meals  levothyroxine 100 MICROGram(s) Oral daily  LORazepam   Injectable 1 milliGRAM(s) IV Push once  metoprolol     tartrate 50 milliGRAM(s) Oral two times a day  potassium chloride    Tablet ER 20 milliEquivalent(s) Oral every 4 hours    MEDICATIONS  (PRN):  ketorolac   Injectable 15 milliGRAM(s) IV Push every 6 hours PRN Moderate Pain (4 - 6)    PE:   General: Lying in bed comfortably. No acute distress  HENT: Normocephalic, atraumatic.   Cardiac: Regular rate, regular rhythm. No murmurs  Lungs: Clear to auscultation bilaterally  Abd: Soft, non-tender, non-distended. +BS. No guarding  Extremities: No lower extremity edema bilaterally

## 2018-03-05 NOTE — PROGRESS NOTE ADULT - SUBJECTIVE AND OBJECTIVE BOX
CHIEF COMPLAINT:Patient is a 72y old  Female who presents with a chief complaint of abdominal pain. Pt     	  REVIEW OF SYSTEMS:  CONSTITUTIONAL: No fever, weight loss, or fatigue  EYES: No eye pain, visual disturbances, or discharge  ENT:  No difficulty hearing, tinnitus, vertigo; No sinus or throat pain  NECK: No pain or stiffness  RESPIRATORY: No cough, wheezing, chills or hemoptysis; No Shortness of Breath  CARDIOVASCULAR: No chest pain, palpitations, passing out, dizziness, or leg swelling  GASTROINTESTINAL: No abdominal or epigastric pain. No nausea, vomiting, or hematemesis; No diarrhea or constipation. No melena or hematochezia.  GENITOURINARY: No dysuria, frequency, hematuria, or incontinence  NEUROLOGICAL: No headaches, memory loss, loss of strength, numbness, or tremors  SKIN: No itching, burning, rashes, or lesions   LYMPH Nodes: No enlarged glands  ENDOCRINE: No heat or cold intolerance; No hair loss  MUSCULOSKELETAL: No joint pain or swelling; No muscle, back, or extremity pain  PSYCHIATRIC: No depression, anxiety, mood swings, or difficulty sleeping  HEME/LYMPH: No easy bruising, or bleeding gums  ALLERGY AND IMMUNOLOGIC: No hives or eczema	        PHYSICAL EXAM:  T(C): 36.9 (03-05-18 @ 05:22), Max: 37.1 (03-04-18 @ 14:32)  HR: 73 (03-05-18 @ 05:22) (64 - 79)  BP: 150/66 (03-05-18 @ 05:22) (132/70 - 161/81)  RR: 18 (03-05-18 @ 05:22) (18 - 18)  SpO2: 99% (03-05-18 @ 05:22) (95% - 99%)      Appearance: Normal	  HEENT:   Normal oral mucosa, PERRL, EOMI	  Lymphatic: No lymphadenopathy  Cardiovascular: Normal S1 S2, No JVD, No murmurs, No edema  Respiratory: Lungs clear to auscultation	  Psychiatry: A & O x 3, Mood & affect appropriate  Gastrointestinal:  Soft, Non-tender, + BS	  Skin: No rashes, No ecchymoses, No cyanosis	  Neurologic: Non-focal  Extremities: Normal range of motion, No clubbing, cyanosis or edema  Vascular: Peripheral pulses palpable 2+ bilaterally    MEDICATIONS  (STANDING):  enoxaparin Injectable 40 milliGRAM(s) SubCutaneous daily  insulin lispro (HumaLOG) corrective regimen sliding scale   SubCutaneous three times a day before meals  levothyroxine 100 MICROGram(s) Oral daily  metoprolol     tartrate 50 milliGRAM(s) Oral two times a day  potassium chloride  10 mEq/100 mL IVPB 10 milliEquivalent(s) IV Intermittent every 1 hour      	  LABS:	 	                        13.1   9.1   )-----------( 343      ( 05 Mar 2018 06:54 )             38.6     03-05    140  |  102  |  8   ----------------------------<  144<H>  3.2<L>   |  29  |  0.80    Ca    9.0      05 Mar 2018 06:54    TPro  7.1  /  Alb  3.3<L>  /  TBili  0.3  /  DBili  x   /  AST  13  /  ALT  15  /  AlkPhos  78  03-04      Lipid Profile: Cholesterol 135  LDL 67  HDL 48    Cholesterol 153  LDL 74  HDL 57      HgA1c: Hemoglobin A1C, Whole Blood: 6.3 % (03-02 @ 09:32)    Cancer Antigen, GI Ca 19-9 (03.04.18 @ 13:24)    Cancer Antigen, GI Ca 19-9: 38.8: METHOD: Tom[employer]ases Chemiluminescent Immunoassay  Values obtained with different assay methods or kits cannot be used  interchangeably.  Results cannot be interpreted as absolute evidence of the presence or  absence of malignant disease. U/mL

## 2018-03-05 NOTE — PROGRESS NOTE ADULT - SUBJECTIVE AND OBJECTIVE BOX
[   ] ICU                                          [   ] CCU                                      [ X  ] Medical Floor    Patient is comfortable. No new complaints reported, No abdominal pain, N/V, hematemesis, hematochezia, melena, fever, chills, chest pain, SOB, cough or diarrhea reported.    VITALS  Vital Signs Last 24 Hrs  T(C): 36.7 (05 Mar 2018 14:51), Max: 36.9 (05 Mar 2018 05:22)  T(F): 98.1 (05 Mar 2018 14:51), Max: 98.4 (05 Mar 2018 05:22)  HR: 75 (05 Mar 2018 14:51) (64 - 75)  BP: 118/55 (05 Mar 2018 14:51) (118/55 - 161/81)  BP(mean): --  RR: 18 (05 Mar 2018 14:51) (18 - 18)  SpO2: 98% (05 Mar 2018 14:51) (95% - 99%)         MEDICATIONS  (STANDING):  enoxaparin Injectable 40 milliGRAM(s) SubCutaneous daily  insulin lispro (HumaLOG) corrective regimen sliding scale   SubCutaneous three times a day before meals  levothyroxine 100 MICROGram(s) Oral daily  LORazepam   Injectable 1 milliGRAM(s) IV Push once  metoprolol     tartrate 50 milliGRAM(s) Oral two times a day  potassium chloride    Tablet ER 20 milliEquivalent(s) Oral every 4 hours    MEDICATIONS  (PRN):  ketorolac   Injectable 15 milliGRAM(s) IV Push every 6 hours PRN Moderate Pain (4 - 6)                            13.1   9.1   )-----------( 343      ( 05 Mar 2018 06:54 )             38.6       03-05    140  |  102  |  8   ----------------------------<  144<H>  3.2<L>   |  29  |  0.80    Ca    9.0      05 Mar 2018 06:54    TPro  7.1  /  Alb  3.3<L>  /  TBili  0.3  /  DBili  x   /  AST  13  /  ALT  15  /  AlkPhos  78  03-04      EXAM:  MR ABDOMEN                            PROCEDURE DATE:  03/05/2018          INTERPRETATION:  MRCP without IV contrast    Indication: Acute pancreatitis.     Technique: Utilizing a 1.5 Keily high-field magnet, multiplanar   multisequence MR images of the abdomen are acquired without IV contrast,   supplemented by thin and thick slab MRCP images. IV contrast is not given   due to patient's inability to tolerate the examination.    Comparison: No prior abdominal MR is available for comparison.    Findings: No evidence for gallstone, thickened bladder wall or   pericholecystic fluid. There is a small 10 mm cystic lesion in the   gallbladder fundal wall, compatible with adenomyomatosis. No intra or   extrahepatic biliary ductal dilatation.The common bile duct measures up   to 4 mm in caliber which is within normal limits. No evidence for   choledocholithiasis. The main pancreatic duct maintains normal caliber   without dilatation. There is normal configuration of the main pancreatic   duct without evidence for pancreas divisum.    There is mild T2 hyperintensity in the pancreatic uncinate process with   adjacent edema. The findings may represent focal acute pancreatitis or   pancreatic mass/cancer. Clinical correlation with pancreatic enzymes is   recommended.    There is a 5 mm brightly T2 hyperintense lesion in the liver dome,   suggestive of a cyst or hemangioma. Further differentiation is difficult   without IV contrast.    Allowing for the noncontrast technique, the spleen, adrenals and right   kidney appear grossly unremarkable. Tiny brightly T2 hyperintense lesions   are seen in the left kidney, representing probable cysts.     No evidence for enlarged lymph node.     Trace perihepatic and perisplenic ascites.    Impression: No evidence for cholelithiasis or choledocholithiasis.    Mild T2 hyperintensity in the pancreatic uncinate process with adjacent   edema. The findings may represent focal acute pancreatitis or pancreatic   mass/cancer. Clinical correlation with pancreatic enzymes is recommended.    Trace ascites.    Other findings as above.

## 2018-03-05 NOTE — PROGRESS NOTE ADULT - PROBLEM SELECTOR PLAN 1
Pancreatitis vs pancreatic CA. Repeat lipase wnl-181. U/S showed no gallstones. Dr. Cowart onboard and to undergo MRCP today.  -F/u MRCP results  -NPO except medications  -C/w toradol 15 mg q6 PRN  -Can switch to PO potassium

## 2018-03-06 VITALS
HEART RATE: 64 BPM | RESPIRATION RATE: 18 BRPM | DIASTOLIC BLOOD PRESSURE: 89 MMHG | TEMPERATURE: 98 F | OXYGEN SATURATION: 97 % | SYSTOLIC BLOOD PRESSURE: 147 MMHG

## 2018-03-06 LAB
ANION GAP SERPL CALC-SCNC: 7 MMOL/L — SIGNIFICANT CHANGE UP (ref 5–17)
BASOPHILS # BLD AUTO: 0.1 K/UL — SIGNIFICANT CHANGE UP (ref 0–0.2)
BASOPHILS NFR BLD AUTO: 0.6 % — SIGNIFICANT CHANGE UP (ref 0–2)
BUN SERPL-MCNC: 17 MG/DL — SIGNIFICANT CHANGE UP (ref 7–18)
CALCIUM SERPL-MCNC: 8.6 MG/DL — SIGNIFICANT CHANGE UP (ref 8.4–10.5)
CHLORIDE SERPL-SCNC: 102 MMOL/L — SIGNIFICANT CHANGE UP (ref 96–108)
CO2 SERPL-SCNC: 30 MMOL/L — SIGNIFICANT CHANGE UP (ref 22–31)
CREAT SERPL-MCNC: 0.76 MG/DL — SIGNIFICANT CHANGE UP (ref 0.5–1.3)
EOSINOPHIL # BLD AUTO: 0.4 K/UL — SIGNIFICANT CHANGE UP (ref 0–0.5)
EOSINOPHIL NFR BLD AUTO: 4.4 % — SIGNIFICANT CHANGE UP (ref 0–6)
GLUCOSE BLDC GLUCOMTR-MCNC: 122 MG/DL — HIGH (ref 70–99)
GLUCOSE BLDC GLUCOMTR-MCNC: 155 MG/DL — HIGH (ref 70–99)
GLUCOSE BLDC GLUCOMTR-MCNC: 178 MG/DL — HIGH (ref 70–99)
GLUCOSE SERPL-MCNC: 149 MG/DL — HIGH (ref 70–99)
HCT VFR BLD CALC: 38.9 % — SIGNIFICANT CHANGE UP (ref 34.5–45)
HGB BLD-MCNC: 12.8 G/DL — SIGNIFICANT CHANGE UP (ref 11.5–15.5)
LYMPHOCYTES # BLD AUTO: 2.1 K/UL — SIGNIFICANT CHANGE UP (ref 1–3.3)
LYMPHOCYTES # BLD AUTO: 22.3 % — SIGNIFICANT CHANGE UP (ref 13–44)
MAGNESIUM SERPL-MCNC: 2.1 MG/DL — SIGNIFICANT CHANGE UP (ref 1.6–2.6)
MCHC RBC-ENTMCNC: 27.2 PG — SIGNIFICANT CHANGE UP (ref 27–34)
MCHC RBC-ENTMCNC: 32.8 GM/DL — SIGNIFICANT CHANGE UP (ref 32–36)
MCV RBC AUTO: 83.1 FL — SIGNIFICANT CHANGE UP (ref 80–100)
MONOCYTES # BLD AUTO: 0.6 K/UL — SIGNIFICANT CHANGE UP (ref 0–0.9)
MONOCYTES NFR BLD AUTO: 6.3 % — SIGNIFICANT CHANGE UP (ref 2–14)
NEUTROPHILS # BLD AUTO: 6.2 K/UL — SIGNIFICANT CHANGE UP (ref 1.8–7.4)
NEUTROPHILS NFR BLD AUTO: 66.3 % — SIGNIFICANT CHANGE UP (ref 43–77)
PHOSPHATE SERPL-MCNC: 3.1 MG/DL — SIGNIFICANT CHANGE UP (ref 2.5–4.5)
PLATELET # BLD AUTO: 353 K/UL — SIGNIFICANT CHANGE UP (ref 150–400)
POTASSIUM SERPL-MCNC: 4.4 MMOL/L — SIGNIFICANT CHANGE UP (ref 3.5–5.3)
POTASSIUM SERPL-SCNC: 4.4 MMOL/L — SIGNIFICANT CHANGE UP (ref 3.5–5.3)
RBC # BLD: 4.68 M/UL — SIGNIFICANT CHANGE UP (ref 3.8–5.2)
RBC # FLD: 11.3 % — SIGNIFICANT CHANGE UP (ref 10.3–14.5)
SODIUM SERPL-SCNC: 139 MMOL/L — SIGNIFICANT CHANGE UP (ref 135–145)
WBC # BLD: 9.3 K/UL — SIGNIFICANT CHANGE UP (ref 3.8–10.5)
WBC # FLD AUTO: 9.3 K/UL — SIGNIFICANT CHANGE UP (ref 3.8–10.5)

## 2018-03-06 PROCEDURE — 83735 ASSAY OF MAGNESIUM: CPT

## 2018-03-06 PROCEDURE — 83690 ASSAY OF LIPASE: CPT

## 2018-03-06 PROCEDURE — 83036 HEMOGLOBIN GLYCOSYLATED A1C: CPT

## 2018-03-06 PROCEDURE — 80048 BASIC METABOLIC PNL TOTAL CA: CPT

## 2018-03-06 PROCEDURE — 74177 CT ABD & PELVIS W/CONTRAST: CPT

## 2018-03-06 PROCEDURE — 85027 COMPLETE CBC AUTOMATED: CPT

## 2018-03-06 PROCEDURE — 83605 ASSAY OF LACTIC ACID: CPT

## 2018-03-06 PROCEDURE — 71046 X-RAY EXAM CHEST 2 VIEWS: CPT

## 2018-03-06 PROCEDURE — 99285 EMERGENCY DEPT VISIT HI MDM: CPT | Mod: 25

## 2018-03-06 PROCEDURE — 80053 COMPREHEN METABOLIC PANEL: CPT

## 2018-03-06 PROCEDURE — 74181 MRI ABDOMEN W/O CONTRAST: CPT

## 2018-03-06 PROCEDURE — 93005 ELECTROCARDIOGRAM TRACING: CPT

## 2018-03-06 PROCEDURE — 82962 GLUCOSE BLOOD TEST: CPT

## 2018-03-06 PROCEDURE — 86301 IMMUNOASSAY TUMOR CA 19-9: CPT

## 2018-03-06 PROCEDURE — 80061 LIPID PANEL: CPT

## 2018-03-06 PROCEDURE — 84100 ASSAY OF PHOSPHORUS: CPT

## 2018-03-06 PROCEDURE — 76700 US EXAM ABDOM COMPLETE: CPT

## 2018-03-06 RX ADMIN — Medication 100 MICROGRAM(S): at 06:12

## 2018-03-06 RX ADMIN — Medication 50 MILLIGRAM(S): at 06:12

## 2018-03-06 RX ADMIN — Medication 50 MILLIGRAM(S): at 17:18

## 2018-03-06 RX ADMIN — ENOXAPARIN SODIUM 40 MILLIGRAM(S): 100 INJECTION SUBCUTANEOUS at 12:36

## 2018-03-06 NOTE — PROGRESS NOTE ADULT - GASTROINTESTINAL DETAILS
no guarding/no distention/no rebound tenderness/no rigidity/bowel sounds normal/soft
soft/no distention/no rebound tenderness/nontender/bowel sounds normal/no guarding

## 2018-03-06 NOTE — DISCHARGE NOTE ADULT - PATIENT PORTAL LINK FT
You can access the [facility] Patient Portal, offered by [facility], by registering with the following website: [email or web address]

## 2018-03-06 NOTE — PROGRESS NOTE ADULT - NEGATIVE GENERAL GENITOURINARY SYMPTOMS
no hematuria/no renal colic/no incontinence/no dysuria
no incontinence/no hematuria/no dysuria/no renal colic

## 2018-03-06 NOTE — DISCHARGE NOTE ADULT - CARE PLAN
Principal Discharge DX:	Pancreatitis  Goal:	to resolve  Assessment and plan of treatment:	improved, no more medications , f/u gastrologist and PCP  Secondary Diagnosis:	DM (diabetes mellitus)  Assessment and plan of treatment:	Take all medications as prescribed.  Seek medical assistance if you experience similar signs or symptoms as this hospitalization.  Follow up laboratory values and clinical status with your primary medical doctor within 10 days of hospital discharge  Secondary Diagnosis:	HTN (hypertension)  Assessment and plan of treatment:	Take all medications as prescribed.  Seek medical assistance if you experience similar signs or symptoms as this hospitalization.  Follow up laboratory values and clinical status with your primary medical doctor within 10 days of hospital discharge

## 2018-03-06 NOTE — PROGRESS NOTE ADULT - SUBJECTIVE AND OBJECTIVE BOX
CHIEF COMPLAINT:Patient is a 72y old  Female who presents with a chief complaint of abdominal pain .Pt has no complaints.    	  REVIEW OF SYSTEMS:  CONSTITUTIONAL: No fever, weight loss, or fatigue  EYES: No eye pain, visual disturbances, or discharge  ENT:  No difficulty hearing, tinnitus, vertigo; No sinus or throat pain  NECK: No pain or stiffness  RESPIRATORY: No cough, wheezing, chills or hemoptysis; No Shortness of Breath  CARDIOVASCULAR: No chest pain, palpitations, passing out, dizziness, or leg swelling  GASTROINTESTINAL: No abdominal or epigastric pain. No nausea, vomiting, or hematemesis; No diarrhea or constipation. No melena or hematochezia.  GENITOURINARY: No dysuria, frequency, hematuria, or incontinence  NEUROLOGICAL: No headaches, memory loss, loss of strength, numbness, or tremors  SKIN: No itching, burning, rashes, or lesions   LYMPH Nodes: No enlarged glands  ENDOCRINE: No heat or cold intolerance; No hair loss  MUSCULOSKELETAL: No joint pain or swelling; No muscle, back, or extremity pain  PSYCHIATRIC: No depression, anxiety, mood swings, or difficulty sleeping  HEME/LYMPH: No easy bruising, or bleeding gums  ALLERGY AND IMMUNOLOGIC: No hives or eczema	      PHYSICAL EXAM:  T(C): 36.7 (03-06-18 @ 05:24), Max: 36.8 (03-05-18 @ 22:14)  HR: 67 (03-06-18 @ 05:24) (67 - 75)  BP: 133/81 (03-06-18 @ 05:24) (118/55 - 150/83)  RR: 17 (03-06-18 @ 05:24) (16 - 18)  SpO2: 97% (03-06-18 @ 05:24) (97% - 98%)      Appearance: Normal	  HEENT:   Normal oral mucosa, PERRL, EOMI	  Lymphatic: No lymphadenopathy  Cardiovascular: Normal S1 S2, No JVD, No murmurs, No edema  Respiratory: Lungs clear to auscultation	  Psychiatry: A & O x 3, Mood & affect appropriate  Gastrointestinal:  Soft, Non-tender, + BS	  Skin: No rashes, No ecchymoses, No cyanosis	  Neurologic: Non-focal  Extremities: Normal range of motion, No clubbing, cyanosis or edema  Vascular: Peripheral pulses palpable 2+ bilaterally    MEDICATIONS  (STANDING):  enoxaparin Injectable 40 milliGRAM(s) SubCutaneous daily  insulin lispro (HumaLOG) corrective regimen sliding scale   SubCutaneous three times a day before meals  levothyroxine 100 MICROGram(s) Oral daily  LORazepam   Injectable 1 milliGRAM(s) IV Push once  metoprolol     tartrate 50 milliGRAM(s) Oral two times a day      LABS:	 	                       12.8   9.3   )-----------( 353      ( 06 Mar 2018 07:51 )             38.9     03-06    139  |  102  |  17  ----------------------------<  149<H>  4.4   |  30  |  0.76    Ca    8.6      06 Mar 2018 07:51  Phos  3.1     03-06  Mg     2.1     03-06      Lipid Profile: Cholesterol 135  LDL 67  HDL 48    Cholesterol 153  LDL 74  HDL 57      HgA1c: Hemoglobin A1C, Whole Blood: 6.3 % (03-02 @ 09:32)        Findings: No evidence for gallstone, thickened bladder wall or   pericholecystic fluid. There is a small 10 mm cystic lesion in the   gallbladder fundal wall, compatible with adenomyomatosis. No intra or   extrahepatic biliary ductal dilatation.The common bile duct measures up   to 4 mm in caliber which is within normal limits. No evidence for   choledocholithiasis. The main pancreatic duct maintains normal caliber   without dilatation. There is normal configuration of the main pancreatic   duct without evidence for pancreas divisum.    There is mild T2 hyperintensity in the pancreatic uncinate process with   adjacent edema. The findings may represent focal acute pancreatitis or   pancreatic mass/cancer. Clinical correlation with pancreatic enzymes is   recommended.    There is a 5 mm brightly T2 hyperintense lesion in the liver dome,   suggestive of a cyst or hemangioma. Further differentiation is difficult   without IV contrast.    Allowing for the noncontrast technique, the spleen, adrenals and right   kidney appear grossly unremarkable. Tiny brightly T2 hyperintense lesions   are seen in the left kidney, representing probable cysts.     No evidence for enlarged lymph node.     Trace perihepatic and perisplenic ascites.    Impression: No evidence for cholelithiasis or choledocholithiasis.    Mild T2 hyperintensity in the pancreatic uncinate process with adjacent   edema. The findings may represent focal acute pancreatitis or pancreatic   mass/cancer. Clinical correlation with pancreatic enzymes is recommended.    Trace ascites.    Other findings as above.

## 2018-03-06 NOTE — DISCHARGE NOTE ADULT - HOSPITAL COURSE
Ms Melendrez is a 72 year old female with PMHx of HTN, DM, and hypothyroidism who presented to the ED because of abdominal pain and constipation for three days. She says that the pain started after eating a bag of peanuts and described the pain as severe and radiating to her back. Pt denied any alcohol, drug use, or tobacco use. Upon arrival to ER a CT scan abdomen was performed which showed focal edema of the pancreatic uncinate process with mild adjacent stranding. Pt was afebrile with normal vitals and labs were drawn with lipase of 181 and wbc of 13.5. Pt was admitted to [address] [place] [address] for workup of possible pancreatitis vs pancreatic mass. On 3/2 pt was made NPO and Ca 19-9 was obtained-normal results of 39.4. Abdominal ultrasound was obtained on 3/2 to rule out gallstones. U/s showed no sonographic evidence of cholelithiasis or acute cholecystitis. No biliary ductal dilatation. GI was consulted and Dr. Cowart met with patient on 3/3 who recommended a MR of abdomen for further evaluation. On 3/4 patient said she did not have anymore pain and repeat lipase was wnl- 162. On 3/5 pt went for MRCP but was unable to tolerate IV contrast so MR abdomen w/o IV contrast was done. Results from this test showed no evidence for cholelithiasis or choledocholithiasis. Mild T2 hyperintensity in the pancreatic uncinate process with adjacent edema. The findings may represent focal acute pancreatitis or pancreatic mass/cancer. Per GI, patient's diet was advanced on 3/6 and her abdominal pain has resolved. Pt can be discharged with further evaluation of possible pancreatic mass/ca as outpatient.

## 2018-03-06 NOTE — PROGRESS NOTE ADULT - PROVIDER SPECIALTY LIST ADULT
Cardiology
Gastroenterology
Internal Medicine
Internal Medicine
Cardiology
Internal Medicine
Gastroenterology

## 2018-03-06 NOTE — DISCHARGE NOTE ADULT - PLAN OF CARE
to resolve improved, no more medications , f/u gastrologist and PCP Take all medications as prescribed.  Seek medical assistance if you experience similar signs or symptoms as this hospitalization.  Follow up laboratory values and clinical status with your primary medical doctor within 10 days of hospital discharge

## 2018-03-06 NOTE — PROGRESS NOTE ADULT - NEUROLOGICAL DETAILS
alert and oriented x 3/responds to verbal commands/sensation intact/responds to pain
responds to pain/responds to verbal commands

## 2018-03-06 NOTE — PROGRESS NOTE ADULT - SUBJECTIVE AND OBJECTIVE BOX
[   ] ICU                                          [   ] CCU                                      [ X  ] Medical Floor    Patient is comfortable. No new complaints reported, No abdominal pain, N/V, hematemesis, hematochezia, melena, fever, chills, chest pain, SOB, cough or diarrhea reported.    VITALS  Vital Signs Last 24 Hrs  T(C): 37.1 (06 Mar 2018 14:01), Max: 37.1 (06 Mar 2018 14:01)  T(F): 98.7 (06 Mar 2018 14:01), Max: 98.7 (06 Mar 2018 14:01)  HR: 73 (06 Mar 2018 17:19) (63 - 74)  BP: 163/87 (06 Mar 2018 17:19) (133/81 - 163/87)  BP(mean): --  RR: 18 (06 Mar 2018 14:01) (16 - 18)  SpO2: 97% (06 Mar 2018 14:01) (97% - 98%)       MEDICATIONS  (STANDING):  enoxaparin Injectable 40 milliGRAM(s) SubCutaneous daily  insulin lispro (HumaLOG) corrective regimen sliding scale   SubCutaneous three times a day before meals  levothyroxine 100 MICROGram(s) Oral daily  LORazepam   Injectable 1 milliGRAM(s) IV Push once  metoprolol     tartrate 50 milliGRAM(s) Oral two times a day    MEDICATIONS  (PRN):                            12.8   9.3   )-----------( 353      ( 06 Mar 2018 07:51 )             38.9       03-06    139  |  102  |  17  ----------------------------<  149<H>  4.4   |  30  |  0.76    Ca    8.6      06 Mar 2018 07:51  Phos  3.1     03-06  Mg     2.1     03-06

## 2018-03-06 NOTE — PROGRESS NOTE ADULT - PROBLEM SELECTOR PLAN 3
-C/w insulin sliding scale
-Insulin sliding scale
BP controlled  -C/w metoprolol 50 mg BID
-Insulin sliding scale

## 2018-03-06 NOTE — PROGRESS NOTE ADULT - NEGATIVE ENMT SYMPTOMS
no gum bleeding/no dysphagia/no nose bleeds/no dry mouth/no hearing difficulty/no throat pain/no ear pain
no dysphagia/no nose bleeds/no throat pain/no hearing difficulty/no gum bleeding/no ear pain

## 2018-03-06 NOTE — PROGRESS NOTE ADULT - PROBLEM SELECTOR PLAN 1
Pain has resolved. MR results from yesterday were inconclusive. Wnl lipase and Ca19-9.   -Advance diet  -Avoid NSAIDS   -Follow up as outpatient regarding MR results

## 2018-03-06 NOTE — PROGRESS NOTE ADULT - PROBLEM SELECTOR PROBLEM 3
DM (diabetes mellitus)
HTN (hypertension)
DM (diabetes mellitus)

## 2018-03-06 NOTE — PROGRESS NOTE ADULT - ASSESSMENT
72 year old female with PMHx of HTN and DM who presented to the ED because of abdominal pain,pancreatitis.  1.GI eval appreciated-await MRCP  2.Advance diet.  3.DM-Insulin.  4.HTN-cont BP medication.  5.GI and DVT prophylaxis.
72 year old female with PMHx of HTN and DM who presented to the ED because of abdominal pain,pancreatitis.  1.GI eval-p.  2.Clear liquid diet.  3.D/C IVF.  4.DM-Insulin.  5.HTN-cont BP medication.  6.GI and DVT prophylaxis.
72 year old female with PMHx of HTN and DM who presented to the ED because of abdominal pain,pancreatitis.  1.GI f/u, if cleared can go home and follow up as outpatient..  2.DM-Insulin.  3.HTN-cont BP medication.  4.GI and DVT prophylaxis.
Ms. Urias is a 72 year old female with PMHx of HTN and DM who presented to the ED because of abdominal pain for the past three days. Said that the pain was located epigastrically and radiated to her back on the left side. Patient also said she has been constipated for the past three days but denied any nausea, vomiting. In ED, vitals were stable and wbc count was 13.5, and lipase was 381. CT abd done which showed focal edema of pancreatic uncinate process with mild adjacent stranding. Patient admitted to [facility] for pancreatitis vs pancreatic ca.
Ms. Urias is a 72 year old female with PMHx of HTN and DM who presented to the ED because of abdominal pain for the past three days. Said that the pain was located epigastrically and radiated to her back on the left side. Patient also said she has been constipated for the past three days but denied any nausea, vomiting. In ED, vitals were stable and wbc count was 13.5, and lipase was 381. CT abd done which showed focal edema of pancreatic uncinate process with mild adjacent stranding. Patient admitted to [facility] for pancreatitis vs pancreatic ca. Pt went for MR abdomen 3/5, was unable to tolerate IV contrast-had MR abdomen w/o IV contrast. Showed possible focal acute pancreatitis vs pancreatic mass.
72 year old female with PMHx of HTN and DM who presented to the ED because of abdominal pain,pancreatitis.  1.GI f/u-await MRCP  2.Replace k+.  3.DM-Insulin.  4.HTN-cont BP medication.  5.GI and DVT prophylaxis.
Ms. Urias is a 72 year old female with PMHx of HTN and DM who presented to the ED because of abdominal pain for the past three days. Said that the pain was located epigastrically and radiated to her back on the left side. Patient also said she has been constipated for the past three days but denied any nausea, vomiting. In ED, vitals were stable and wbc count was 13.5, and lipase was 381. CT abd done which showed focal edema of pancreatic uncinate process with mild adjacent stranding. Patient admitted to [facility] for pancreatitis vs pancreatic ca.
1. Abdominal pain (improved)  2. Pancreatitis (improving)    Suggestions:    1. Advance diet as tolerated  2. Protonix daily  3. Avoid NSAID  4. Repeat MRI of abdomen with contrast in 3 months  5. DVT prophylaxis
1. Pancreatitis (improving)  2. Abdominal pain(improving)    Suggestions:    1. Check CA 19-9  2. Advance diet as toleated  3. Protonix daily  4. Avoid NSAID  5. DVT prophylaxis

## 2018-03-06 NOTE — PROGRESS NOTE ADULT - NEGATIVE GASTROINTESTINAL SYMPTOMS
no jaundice/no diarrhea/no melena/no abdominal pain/no hematochezia/no nausea
no diarrhea/no nausea/no abdominal pain/no melena/no hematochezia/no jaundice/no vomiting

## 2018-03-06 NOTE — PROGRESS NOTE ADULT - SUBJECTIVE AND OBJECTIVE BOX
Patient seen and examined at bedside. No events overnight, afebrile. Says that her pain is completely resolved. Had BM this AM. Says she is ready to go home.    ICU Vital Signs Last 24 Hrs  T(C): 36.7 (06 Mar 2018 05:24), Max: 36.8 (05 Mar 2018 22:14)  T(F): 98.1 (06 Mar 2018 05:24), Max: 98.3 (05 Mar 2018 22:14)  HR: 67 (06 Mar 2018 05:24) (67 - 75)  BP: 133/81 (06 Mar 2018 05:24) (118/55 - 150/83)  RR: 17 (06 Mar 2018 05:24) (16 - 18)  SpO2: 97% (06 Mar 2018 05:24) (97% - 98%)    .  LABS:                         12.8   9.3   )-----------( 353      ( 06 Mar 2018 07:51 )             38.9     03-06    139  |  102  |  17  ----------------------------<  149<H>  4.4   |  30  |  0.76    Ca    8.6      06 Mar 2018 07:51  Phos  3.1     03-06  Mg     2.1     03-06      EXAM:  MR ABDOMEN                            PROCEDURE DATE:  03/05/2018          INTERPRETATION:  MRCP without IV contrast    Indication: Acute pancreatitis.     Technique: Utilizing a 1.5 Keily high-field magnet, multiplanar   multisequence MR images of the abdomen are acquired without IV contrast,   supplemented by thin and thick slab MRCP images. IV contrast is not given   due to patient's inability to tolerate the examination.    Comparison: No prior abdominal MR is available for comparison.    Findings: No evidence for gallstone, thickened bladder wall or   pericholecystic fluid. There is a small 10 mm cystic lesion in the   gallbladder fundal wall, compatible with adenomyomatosis. No intra or   extrahepatic biliary ductal dilatation.The common bile duct measures up   to 4 mm in caliber which is within normal limits. No evidence for   choledocholithiasis. The main pancreatic duct maintains normal caliber   without dilatation. There is normal configuration of the main pancreatic   duct without evidence for pancreas divisum.    There is mild T2 hyperintensity in the pancreatic uncinate process with   adjacent edema. The findings may represent focal acute pancreatitis or   pancreatic mass/cancer. Clinical correlation with pancreatic enzymes is     recommended.    There is a 5 mm brightly T2 hyperintense lesion in the liver dome,   suggestive of a cyst or hemangioma. Further differentiation is difficult   without IV contrast.    Allowing for the noncontrast technique, the spleen, adrenals and right   kidney appear grossly unremarkable. Tiny brightly T2 hyperintense lesions   are seen in the left kidney, representing probable cysts.     No evidence for enlarged lymph node.     Trace perihepatic and perisplenic ascites.    Impression: No evidence for cholelithiasis or choledocholithiasis.    Mild T2 hyperintensity in the pancreatic uncinate process with adjacent   edema. The findings may represent focal acute pancreatitis or pancreatic   mass/cancer. Clinical correlation with pancreatic enzymes is recommended.    Trace ascites.      MEDICATIONS  (STANDING):  enoxaparin Injectable 40 milliGRAM(s) SubCutaneous daily  insulin lispro (HumaLOG) corrective regimen sliding scale   SubCutaneous three times a day before meals  levothyroxine 100 MICROGram(s) Oral daily  LORazepam   Injectable 1 milliGRAM(s) IV Push once  metoprolol     tartrate 50 milliGRAM(s) Oral two times a day    PE:  General: Well appearing in no acute distress  HENT: Normocephalic, atraumatic. No JVD  Lungs: Clear to auscultation bilaterally. No wheezing  Cardiac: Regular rate, regular rhythm. No murmurs  Abd: Soft, non-tender, non-distended. +BS. No guarding.  Extremities: No lower extremity edema bilaterally

## 2018-03-06 NOTE — PROGRESS NOTE ADULT - RS GEN PE MLT RESP DETAILS PC
good air movement/breath sounds equal/airway patent/respirations non-labored
airway patent/respirations non-labored/good air movement/breath sounds equal

## 2018-03-06 NOTE — PROGRESS NOTE ADULT - NEGATIVE MUSCULOSKELETAL SYMPTOMS
no arthralgia/no stiffness/no muscle cramps
no stiffness/no muscle cramps/no neck pain/no arthralgia

## 2018-03-06 NOTE — PROGRESS NOTE ADULT - PROBLEM SELECTOR PROBLEM 2
DM (diabetes mellitus)
HTN (hypertension)

## 2018-03-06 NOTE — PROGRESS NOTE ADULT - PROBLEM SELECTOR PLAN 4
-Levothyroxine 100 mcg
C/w levothyroxine 100 mcg
-Levothyroxine 100 mcg
C/w levothyroxine 100 mcg

## 2022-02-16 NOTE — PATIENT PROFILE ADULT. - PATIENT REPRESENTATIVE NAME
sterile technique, indwelling urinary device inserted/a urinary catheter insertion kit was used for all insertion materials
Gracie El

## 2023-05-01 NOTE — PROGRESS NOTE ADULT - PROBLEM SELECTOR PLAN 2
C/w metoprolol 50 mg BID
-C/w insulin sliding scale
BP well controlled  -C/w metoprolol 50 mg BID
Eucrisa Counseling: Patient may experience a mild burning sensation during topical application. Eucrisa is not approved in children less than 2 years of age.
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
